# Patient Record
Sex: FEMALE | Race: WHITE
[De-identification: names, ages, dates, MRNs, and addresses within clinical notes are randomized per-mention and may not be internally consistent; named-entity substitution may affect disease eponyms.]

---

## 2018-01-16 ENCOUNTER — HOSPITAL ENCOUNTER (OUTPATIENT)
Dept: HOSPITAL 61 - LAB | Age: 48
Discharge: HOME | End: 2018-01-16
Payer: COMMERCIAL

## 2018-01-16 DIAGNOSIS — Z34.91: Primary | ICD-10-CM

## 2018-01-16 DIAGNOSIS — Z3A.08: ICD-10-CM

## 2018-01-16 LAB
ADD MAN DIFF?: NO
BASO #: 0.1 X10^3/UL (ref 0–0.2)
BASO %: 1 % (ref 0–3)
EOS #: 0.3 X10^3/UL (ref 0–0.7)
EOS %: 4 % (ref 0–3)
HCG SERPL-ACNC: 8.2 X10^3/UL (ref 4–11)
HEMATOCRIT: 43 % (ref 36–47)
HEMOGLOBIN: 14.5 G/DL (ref 12–15.5)
LYMPH #: 2.5 X10^3/UL (ref 1–4.8)
LYMPH %: 31 % (ref 24–48)
MEAN CORPUSCULAR HEMOGLOBIN: 30 PG (ref 25–35)
MEAN CORPUSCULAR HGB CONC: 34 G/DL (ref 31–37)
MEAN CORPUSCULAR VOLUME: 89 FL (ref 79–100)
MONO #: 0.5 X10^3/UL (ref 0–1.1)
MONO %: 6 % (ref 0–9)
NEUT #: 4.8 X10^3UL (ref 1.8–7.7)
NEUT %: 59 % (ref 31–73)
PLATELET COUNT: 292 X10^3/UL (ref 140–400)
RED BLOOD COUNT: 4.82 X10^6/UL (ref 3.5–5.4)
RED CELL DISTRIBUTION WIDTH: 13.9 % (ref 11.5–14.5)

## 2018-01-16 PROCEDURE — 83001 ASSAY OF GONADOTROPIN (FSH): CPT

## 2018-01-16 PROCEDURE — 85025 COMPLETE CBC W/AUTO DIFF WBC: CPT

## 2018-01-16 PROCEDURE — 84144 ASSAY OF PROGESTERONE: CPT

## 2018-01-16 PROCEDURE — 82670 ASSAY OF TOTAL ESTRADIOL: CPT

## 2018-01-16 PROCEDURE — 84403 ASSAY OF TOTAL TESTOSTERONE: CPT

## 2018-01-16 PROCEDURE — 36415 COLL VENOUS BLD VENIPUNCTURE: CPT

## 2018-01-17 LAB
ESTRADIOL LEVEL: 214.7 PG/ML
FSH: 16.3 MIU/ML
PROGESTERONE: 3.5 NG/ML
TESTOSTERONE TOTAL: 94 NG/DL (ref 8–48)

## 2019-07-12 ENCOUNTER — HOSPITAL ENCOUNTER (OUTPATIENT)
Dept: HOSPITAL 63 - MAMMO | Age: 49
Discharge: HOME | End: 2019-07-12
Attending: FAMILY MEDICINE
Payer: COMMERCIAL

## 2019-07-12 DIAGNOSIS — Z12.31: Primary | ICD-10-CM

## 2019-07-12 DIAGNOSIS — N64.89: ICD-10-CM

## 2019-07-12 PROCEDURE — 77063 BREAST TOMOSYNTHESIS BI: CPT

## 2019-07-12 PROCEDURE — 77067 SCR MAMMO BI INCL CAD: CPT

## 2019-07-25 NOTE — RAD
EXAM: MAMMO POLINA SCREENING BILATERAL



HISTORY: routine screening evaluation. 



COMPARISON: None, new baseline examination



Bilateral CC and MLO views of the breasts were performed. Bilateral breast 
tomosynthesis was performed in CC and MLO projections.



This study was interpreted with the benefit of Computerized Aided Detection 
(CAD).



Breast Density: The breast parenchyma shows scattered fibroglandular densities. 
Breast parenchyma level B.



FINDINGS:

Benign calcifications are present. 

No suspicious masses, microcalcifications or architectural distortion is present
to suggest malignancy in either breast.

The visualized axillae are unremarkable. 



IMPRESSION: No mammographic evidence of malignancy. 



BI-RADS CATEGORY: 2 BENIGN FINDING(S)



RECOMMENDED FOLLOW-UP: 12M 12 MONTH FOLLOW-UP Annual screening mammography is 
recommended, unless clinically indicated sooner based on symptoms or change in 
physical exam.



PQRS compliance statement: Patient information was entered into a reminder 
system with a target due date for the next mammogram.



Mammography is a sensitive method for finding small breast cancers, but it does 
not detect them all and is not a substitute for careful clinical examination. A 
negative mammogram does not negate a clinically suspicious finding and should 
not result in delay in biopsying a clinically suspicious abnormality.



"Our facility is accredited by the [employer] Mammography 
Program."

MTDD